# Patient Record
Sex: FEMALE | Race: WHITE | Employment: FULL TIME | URBAN - METROPOLITAN AREA
[De-identification: names, ages, dates, MRNs, and addresses within clinical notes are randomized per-mention and may not be internally consistent; named-entity substitution may affect disease eponyms.]

---

## 2024-08-12 RX ORDER — TOBRAMYCIN 3 MG/ML
SOLUTION/ DROPS OPHTHALMIC
Qty: 5 ML | Refills: 0 | Status: SHIPPED | OUTPATIENT
Start: 2024-08-12

## 2024-08-12 NOTE — TELEPHONE ENCOUNTER
Last seen Visit date not found  Next appt 8/26/2024    Requested Prescriptions     Pending Prescriptions Disp Refills    tobramycin (TOBREX) 0.3 % ophthalmic solution [Pharmacy Med Name: TOBRAMYCIN 0.3% EYE DROP] 5 mL      Sig: INSTILL 2 DROPS INTO THE AFFECTED EYE THREE TIMES A DAY

## 2024-12-10 ENCOUNTER — OFFICE VISIT (OUTPATIENT)
Age: 54
End: 2024-12-10
Payer: COMMERCIAL

## 2024-12-10 VITALS
HEIGHT: 67 IN | WEIGHT: 186 LBS | OXYGEN SATURATION: 100 % | HEART RATE: 63 BPM | RESPIRATION RATE: 16 BRPM | TEMPERATURE: 97.9 F | SYSTOLIC BLOOD PRESSURE: 118 MMHG | DIASTOLIC BLOOD PRESSURE: 73 MMHG | BODY MASS INDEX: 29.19 KG/M2

## 2024-12-10 DIAGNOSIS — G89.29 CHRONIC BILATERAL LOW BACK PAIN WITHOUT SCIATICA: ICD-10-CM

## 2024-12-10 DIAGNOSIS — G89.29 CHRONIC RIGHT SHOULDER PAIN: ICD-10-CM

## 2024-12-10 DIAGNOSIS — Z82.49 FAMILY HISTORY OF CORONARY ARTERY DISEASE: ICD-10-CM

## 2024-12-10 DIAGNOSIS — M54.50 CHRONIC BILATERAL LOW BACK PAIN WITHOUT SCIATICA: ICD-10-CM

## 2024-12-10 DIAGNOSIS — M25.511 CHRONIC RIGHT SHOULDER PAIN: ICD-10-CM

## 2024-12-10 DIAGNOSIS — E55.9 VITAMIN D DEFICIENCY: ICD-10-CM

## 2024-12-10 DIAGNOSIS — Z00.00 ENCOUNTER FOR WELL ADULT EXAM WITHOUT ABNORMAL FINDINGS: Primary | ICD-10-CM

## 2024-12-10 PROCEDURE — 99396 PREV VISIT EST AGE 40-64: CPT | Performed by: FAMILY MEDICINE

## 2024-12-10 RX ORDER — OMEGA-3 FATTY ACIDS/FISH OIL 300-1000MG
CAPSULE ORAL
COMMUNITY

## 2024-12-10 RX ORDER — ACETAMINOPHEN 160 MG
4000 TABLET,DISINTEGRATING ORAL DAILY
COMMUNITY

## 2024-12-10 SDOH — ECONOMIC STABILITY: FOOD INSECURITY: WITHIN THE PAST 12 MONTHS, YOU WORRIED THAT YOUR FOOD WOULD RUN OUT BEFORE YOU GOT MONEY TO BUY MORE.: NEVER TRUE

## 2024-12-10 SDOH — ECONOMIC STABILITY: INCOME INSECURITY: HOW HARD IS IT FOR YOU TO PAY FOR THE VERY BASICS LIKE FOOD, HOUSING, MEDICAL CARE, AND HEATING?: NOT HARD AT ALL

## 2024-12-10 SDOH — ECONOMIC STABILITY: FOOD INSECURITY: WITHIN THE PAST 12 MONTHS, THE FOOD YOU BOUGHT JUST DIDN'T LAST AND YOU DIDN'T HAVE MONEY TO GET MORE.: NEVER TRUE

## 2024-12-10 ASSESSMENT — ENCOUNTER SYMPTOMS
FACIAL SWELLING: 0
EYES NEGATIVE: 1
SINUS PAIN: 0
GASTROINTESTINAL NEGATIVE: 1
RECTAL PAIN: 0
BACK PAIN: 1
ALLERGIC/IMMUNOLOGIC NEGATIVE: 1
RESPIRATORY NEGATIVE: 1

## 2024-12-10 ASSESSMENT — PATIENT HEALTH QUESTIONNAIRE - PHQ9
SUM OF ALL RESPONSES TO PHQ QUESTIONS 1-9: 0
SUM OF ALL RESPONSES TO PHQ9 QUESTIONS 1 & 2: 0
2. FEELING DOWN, DEPRESSED OR HOPELESS: NOT AT ALL
SUM OF ALL RESPONSES TO PHQ QUESTIONS 1-9: 0
1. LITTLE INTEREST OR PLEASURE IN DOING THINGS: NOT AT ALL
SUM OF ALL RESPONSES TO PHQ QUESTIONS 1-9: 0
SUM OF ALL RESPONSES TO PHQ QUESTIONS 1-9: 0

## 2024-12-10 NOTE — PROGRESS NOTES
Well Adult Note  Name: Tiffany Landin Today’s Date: 12/10/2024   MRN: [id number] Sex: Female   Age: 54 y.o. Ethnicity: Non- / Non    : 1970 Race: White (non-)      Tiffany Landin is here for a well adult exam.       Assessment & Plan   Encounter for well adult exam without abnormal findings  -     Comprehensive Metabolic Panel, Fasting; Future  -     CBC with Auto Differential; Future  -     Lipid, Fasting; Future  Chronic bilateral low back pain without sciatica  Chronic right shoulder pain  -     XR SHOULDER RIGHT (MIN 2 VIEWS); Future  Family history of coronary artery disease  -     High sensitivity CRP; Future  Vitamin D deficiency  -     Vitamin D 25 Hydroxy; Future  BMI 29.0-29.9,adult  -     TSH; Future   In summary I think this patient is very well healthy wise cardiopulmonary wise GI wise she is having some musculoskeletal issues she is already can entertain some therapy on her hip and back I printed some shoulder exercises I did print her a slip to get an x-ray of her shoulder if it does not improve.  She can take some over-the-counter anti-inflammatories for several weeks.  I will call her with her lipid metabolic panel CBC CRP level vitamin D level and TSH level.  Her Cologuard is up-to-date her mammogram is up-to-date she will see me yearly unless her musculoskeletal issues persist.  Return in 1 year (on 12/10/2025) for CPE (Physical Exam).       Subjective   History:  Patient comes in today for a wellness visit.  The patient is going to get an outpatient metabolic panel lipid panel CBC will do a vitamin D level TSH level we will also do a highly sensitive CRP with some of her grandparents having early coronary disease.  Her Cologuard was done 2024 was negative she will make sure she gets yearly mammograms.  I will call her with her lab work.  The biggest issue she is having some arthritic complaints of musculoskeletal complaints she had an orthopedic evaluation

## 2024-12-17 LAB
ALBUMIN: NORMAL
ALP BLD-CCNC: NORMAL U/L
ALT SERPL-CCNC: NORMAL U/L
AST SERPL-CCNC: NORMAL U/L
BASOPHILS ABSOLUTE: NORMAL
BASOPHILS RELATIVE PERCENT: NORMAL
BILIRUB SERPL-MCNC: NORMAL MG/DL
BUN BLDV-MCNC: NORMAL MG/DL
CALCIUM SERPL-MCNC: NORMAL MG/DL
CHLORIDE BLD-SCNC: NORMAL MMOL/L
CHOLESTEROL, FASTING: NORMAL
CO2: NORMAL
CREAT SERPL-MCNC: NORMAL MG/DL
EOSINOPHILS ABSOLUTE: NORMAL
EOSINOPHILS RELATIVE PERCENT: NORMAL
GLUCOSE FASTING: NORMAL
HCT VFR BLD CALC: NORMAL %
HDLC SERPL-MCNC: NORMAL MG/DL
HEMOGLOBIN: NORMAL
LDL CHOLESTEROL: NORMAL
LYMPHOCYTES ABSOLUTE: NORMAL
LYMPHOCYTES RELATIVE PERCENT: NORMAL
MCH RBC QN AUTO: NORMAL PG
MCHC RBC AUTO-ENTMCNC: NORMAL G/DL
MCV RBC AUTO: NORMAL FL
MONOCYTES ABSOLUTE: NORMAL
MONOCYTES RELATIVE PERCENT: NORMAL
NEUTROPHILS ABSOLUTE: NORMAL
NEUTROPHILS RELATIVE PERCENT: NORMAL
PDW BLD-RTO: NORMAL %
PLATELET # BLD: NORMAL 10*3/UL
PMV BLD AUTO: NORMAL FL
POTASSIUM SERPL-SCNC: NORMAL MMOL/L
RBC # BLD: NORMAL 10*6/UL
SODIUM BLD-SCNC: NORMAL MMOL/L
TOTAL PROTEIN: NORMAL
TRIGLYCERIDE, FASTING: NORMAL
TSH SERPL DL<=0.05 MIU/L-ACNC: NORMAL M[IU]/L
VITAMIN D 25-HYDROXY: NORMAL
VITAMIN D2, 25 HYDROXY: NORMAL
VITAMIN D3,25 HYDROXY: NORMAL
WBC # BLD: NORMAL 10*3/UL

## 2024-12-18 DIAGNOSIS — G89.29 CHRONIC BILATERAL THORACIC BACK PAIN: ICD-10-CM

## 2024-12-18 DIAGNOSIS — Z00.00 ENCOUNTER FOR WELL ADULT EXAM WITHOUT ABNORMAL FINDINGS: ICD-10-CM

## 2024-12-18 DIAGNOSIS — M54.50 ACUTE BILATERAL LOW BACK PAIN, UNSPECIFIED WHETHER SCIATICA PRESENT: Primary | ICD-10-CM

## 2024-12-18 DIAGNOSIS — M25.552 BILATERAL HIP PAIN: ICD-10-CM

## 2024-12-18 DIAGNOSIS — E55.9 VITAMIN D DEFICIENCY: ICD-10-CM

## 2024-12-18 DIAGNOSIS — M54.6 CHRONIC BILATERAL THORACIC BACK PAIN: ICD-10-CM

## 2024-12-18 DIAGNOSIS — M25.551 BILATERAL HIP PAIN: ICD-10-CM

## 2025-02-27 ENCOUNTER — TELEPHONE (OUTPATIENT)
Age: 55
End: 2025-02-27

## 2025-02-27 DIAGNOSIS — H10.33 ACUTE BACTERIAL CONJUNCTIVITIS OF BOTH EYES: Primary | ICD-10-CM

## 2025-02-27 RX ORDER — TOBRAMYCIN 3 MG/ML
SOLUTION/ DROPS OPHTHALMIC
Qty: 5 ML | Refills: 0 | Status: SHIPPED | OUTPATIENT
Start: 2025-02-27

## 2025-02-27 NOTE — TELEPHONE ENCOUNTER
Patient called because her eyelids are red & patchy and have dry spots on them. It started about 5 days ago and at that time it was just her right eye. She has been putting lotion on them but it's not helping. There is no crust or drainage but they are rough in texture. Sometimes they are a little itchy but not terrible. They do not look swollen but they feel swollen. Her vision is not affected. Do you need to see her or can you prescribe her something?     NKLLOYD  She uses [facility] in [place]

## 2025-02-27 NOTE — TELEPHONE ENCOUNTER
I will put her on some eyedrops and if they do not improve in the next week have her see her optometrist or eye doctor if she does not have 1 I can send her to 1

## 2025-05-14 LAB — MAMMOGRAPHY, EXTERNAL: NORMAL

## 2025-05-30 ENCOUNTER — OFFICE VISIT (OUTPATIENT)
Age: 55
End: 2025-05-30
Payer: COMMERCIAL

## 2025-05-30 VITALS
SYSTOLIC BLOOD PRESSURE: 113 MMHG | DIASTOLIC BLOOD PRESSURE: 72 MMHG | TEMPERATURE: 97.7 F | HEIGHT: 67 IN | OXYGEN SATURATION: 100 % | HEART RATE: 76 BPM | WEIGHT: 181 LBS | RESPIRATION RATE: 16 BRPM | BODY MASS INDEX: 28.41 KG/M2

## 2025-05-30 DIAGNOSIS — D25.1 INTRAMURAL LEIOMYOMA OF UTERUS: ICD-10-CM

## 2025-05-30 DIAGNOSIS — Z01.818 PREOP EXAMINATION: Primary | ICD-10-CM

## 2025-05-30 DIAGNOSIS — N93.8 DYSFUNCTIONAL UTERINE BLEEDING: ICD-10-CM

## 2025-05-30 DIAGNOSIS — N83.202 OVARIAN CYST, LEFT: ICD-10-CM

## 2025-05-30 PROCEDURE — 99214 OFFICE O/P EST MOD 30 MIN: CPT | Performed by: FAMILY MEDICINE

## 2025-05-30 RX ORDER — PREDNISONE 50 MG/1
TABLET ORAL
COMMUNITY
Start: 2025-05-22

## 2025-05-30 SDOH — ECONOMIC STABILITY: FOOD INSECURITY: WITHIN THE PAST 12 MONTHS, YOU WORRIED THAT YOUR FOOD WOULD RUN OUT BEFORE YOU GOT MONEY TO BUY MORE.: NEVER TRUE

## 2025-05-30 SDOH — ECONOMIC STABILITY: FOOD INSECURITY: WITHIN THE PAST 12 MONTHS, THE FOOD YOU BOUGHT JUST DIDN'T LAST AND YOU DIDN'T HAVE MONEY TO GET MORE.: NEVER TRUE

## 2025-05-30 ASSESSMENT — PATIENT HEALTH QUESTIONNAIRE - PHQ9
SUM OF ALL RESPONSES TO PHQ QUESTIONS 1-9: 0
1. LITTLE INTEREST OR PLEASURE IN DOING THINGS: NOT AT ALL
SUM OF ALL RESPONSES TO PHQ QUESTIONS 1-9: 0
2. FEELING DOWN, DEPRESSED OR HOPELESS: NOT AT ALL

## 2025-05-30 NOTE — ASSESSMENT & PLAN NOTE
Monitored by specialist- no acute findings meriting change in the plan  Upcoming hysterectomy planned along with oophorectomies.

## 2025-05-30 NOTE — PROGRESS NOTES
Tiffany Landin (:  1970) is a 54 y.o. female,Established patient, here for evaluation of the following chief complaint(s):  Pre-op Exam (Hysterectomy 25 by Dr. Kang at  [facility] Her labs and EKG are in the Care Everywhere folder)         Assessment & Plan  Preop examination  This patient is stable for surgery.  She has a low risk assessment her EKG and labs were all in order and stable as was her exam         Dysfunctional uterine bleeding   Monitored by specialist- no acute findings meriting change in the plan  Upcoming hysterectomy planned along with oophorectomies.       Intramural leiomyoma of uterus   Monitored by specialist- no acute findings meriting change in the plan  Upcoming hysterectomy and oophorectomy is planned       Ovarian cyst, left    Upcoming hysterectomy and CINDY/BSO's plan           No follow-ups on file.     In summary this patient is a low risk assessment for upcoming surgery I reviewed her labs her EKG I will fax a copy of this report to Dr. Kang with her preop clearance paper.  I will follow her postoperatively as need be.  I feel she will do very very well.  Subjective   HPI:  Patient comes in today for preop clearance for upcoming hysterectomy with BSO laparoscopically and robotically.  This is good to be done at [facility] under Dr. Kang Tiffany is currently healthy without any major major medical problems.  Her EKG is unrevealing her labs were stable.  She is having no issues other than dysfunctional uterine bleeding.  She is a stable on her exam today her blood pressure stable I reviewed her EKG reviewed her labs.  Hemoglobin is 14.    Review of Systems  Review of Systems   Constitutional: Negative.    HENT: Negative.  Negative for dental problem, drooling, facial swelling, mouth sores and sinus pain.    Eyes: Negative.    Respiratory: Negative.     Cardiovascular: Negative.    Gastrointestinal: Negative.  Negative for rectal pain.   Endocrine: Negative.    Genitourinary:

## 2025-05-30 NOTE — ASSESSMENT & PLAN NOTE
Monitored by specialist- no acute findings meriting change in the plan  Upcoming hysterectomy and oophorectomy is planned